# Patient Record
Sex: FEMALE | Race: WHITE | NOT HISPANIC OR LATINO | Employment: OTHER | URBAN - METROPOLITAN AREA
[De-identification: names, ages, dates, MRNs, and addresses within clinical notes are randomized per-mention and may not be internally consistent; named-entity substitution may affect disease eponyms.]

---

## 2017-01-06 RX ORDER — ESTRADIOL 0.04 MG/D
FILM, EXTENDED RELEASE TRANSDERMAL
Qty: 24 PATCH | Refills: 0 | Status: SHIPPED | OUTPATIENT
Start: 2017-01-06 | End: 2017-01-27

## 2017-01-11 RX ORDER — ESTRADIOL 0.07 MG/D
1 FILM, EXTENDED RELEASE TRANSDERMAL 2 TIMES WEEKLY
Qty: 8 PATCH | Refills: 0 | Status: SHIPPED | OUTPATIENT
Start: 2017-01-12 | End: 2017-01-27

## 2017-01-11 NOTE — TELEPHONE ENCOUNTER
Patient requesting a one month refill on Minivelle .075mg. She has an appointment with Dr. Marie on 1/27/2017. E-Rx sent to [facility] [place]

## 2017-01-12 ENCOUNTER — TELEPHONE (OUTPATIENT)
Dept: OBSTETRICS AND GYNECOLOGY | Facility: CLINIC | Age: 67
End: 2017-01-12

## 2017-01-12 NOTE — TELEPHONE ENCOUNTER
----- Message from Carmen Mcgregor sent at 1/12/2017 10:05 AM EST -----  Regarding: ALBINA PRESCRITION CALLED IN TO PHARMACY   Contact: [phone]    0.075 minivelle  Called in for 1 month  To [facility] [place]    prescription to [facility] has been cancelled due insurance to excpensive. [facility] [place].    @10:41am advised patient I sent E-Rx yesterday to [facility] [place]

## 2017-01-27 ENCOUNTER — OFFICE VISIT (OUTPATIENT)
Dept: OBSTETRICS AND GYNECOLOGY | Facility: CLINIC | Age: 67
End: 2017-01-27

## 2017-01-27 VITALS
WEIGHT: 219 LBS | SYSTOLIC BLOOD PRESSURE: 124 MMHG | HEIGHT: 67 IN | DIASTOLIC BLOOD PRESSURE: 80 MMHG | BODY MASS INDEX: 34.37 KG/M2

## 2017-01-27 DIAGNOSIS — Z00.00 ANNUAL PHYSICAL EXAM: Primary | ICD-10-CM

## 2017-01-27 DIAGNOSIS — N95.1 MENOPAUSAL SYMPTOMS: ICD-10-CM

## 2017-01-27 PROBLEM — Z90.710 S/P HYSTERECTOMY: Status: ACTIVE | Noted: 2017-01-27

## 2017-01-27 PROBLEM — K58.2 IRRITABLE BOWEL SYNDROME WITH BOTH CONSTIPATION AND DIARRHEA: Status: ACTIVE | Noted: 2017-01-27

## 2017-01-27 PROCEDURE — 99396 PREV VISIT EST AGE 40-64: CPT | Performed by: OBSTETRICS & GYNECOLOGY

## 2017-01-27 RX ORDER — ESTRADIOL 0.07 MG/D
1 FILM, EXTENDED RELEASE TRANSDERMAL 2 TIMES WEEKLY
COMMUNITY
End: 2017-01-27 | Stop reason: ALTCHOICE

## 2017-01-27 RX ORDER — ESTRADIOL 0.05 MG/D
1 FILM, EXTENDED RELEASE TRANSDERMAL WEEKLY
Qty: 8 PATCH | Refills: 12 | Status: SHIPPED | OUTPATIENT
Start: 2017-01-27 | End: 2018-01-24 | Stop reason: SDUPTHER

## 2017-01-27 RX ORDER — CHOLECALCIFEROL (VITAMIN D3) 125 MCG
2000 CAPSULE ORAL DAILY
COMMUNITY

## 2017-01-27 NOTE — MR AVS SNAPSHOT
Gale Nina   1/27/2017 1:30 PM   Office Visit    Dept Phone:  [phone]   Encounter #:  [id number]    Provider:  Zach Marie MD   Department:  [facility]                Your Full Care Plan              Today's Medication Changes          These changes are accurate as of: 1/27/17  2:24 PM.  If you have any questions, ask your nurse or doctor.               New Medication(s)Ordered:     MINIVELLE 0.05 MG/24HR patch   Generic drug:  estradiol   Place 1 patch on the skin 1 (One) Time Per Week.   Replaces:  MINIVELLE 0.0375 MG/24HR   Started by:  Zach Marie MD         Medication(s)that have changed:     Vitamin D3 2000 UNITS tablet   Take 2,000 Units by mouth Daily.   What changed:  Another medication with the same name was removed. Continue taking this medication, and follow the directions you see here.   Changed by:  Zach Marie MD         Stop taking medication(s)listed here:     estradiol 0.075 MG/24HR patch   Commonly known as:  MINIVELLE   Stopped by:  Zach Marie MD           INVOKANA 100 MG tablet   Generic drug:  Canagliflozin   Stopped by:  Zach Marie MD           MINIVELLE 0.0375 MG/24HR   Generic drug:  estradiol   Replaced by:  MINIVELLE 0.05 MG/24HR patch   Stopped by:  Zach Marie MD           MINIVELLE 0.075 MG/24HR patch   Generic drug:  estradiol   Stopped by:  Zach Marie MD           RED YEAST RICE PO   Stopped by:  Zach Marie MD                Where to Get Your Medications      These medications were [facility] - [place], [place] - [address] AT Portal to Registered [facility] Sites - [phone]  - [phone] FX  [address], [facility] [address]     Phone:  [phone]     MINIVELLE 0.05 MG/24HR patch                  Your Updated Medication List          This list is accurate as of: 1/27/17  2:24 PM.  Always use your most recent med list.                aspirin 325 MG tablet       atorvastatin 10 MG tablet   Commonly known as:  LIPITOR       B-6 PO       JARDIANCE 10 MG tablet   Generic drug:  Empagliflozin       metFORMIN 500 MG tablet   Commonly known as:  GLUCOPHAGE       MINIVELLE 0.05 MG/24HR patch   Generic drug:  estradiol   Place 1 patch on the skin 1 (One) Time Per Week.       omeprazole 40 MG capsule   Commonly known as:  priLOSEC       triamterene-hydrochlorothiazide 37.5-25 MG per tablet   Commonly known as:  MAXZIDE-25       TYLENOL ARTHRITIS PAIN PO       Vitamin D3 2000 UNITS tablet               You Were Diagnosed With        Codes Comments    Annual physical exam    -  Primary ICD-10-CM: Z00.00  ICD-9-CM: V70.0     Menopausal symptoms     ICD-10-CM: N95.1  ICD-9-CM: 627.2       Instructions     None    Patient Instructions History      Upcoming Appointments     Visit Type Date Time Department    ANNUAL 2017  1:30 PM [facility]      [facility] Signup     [facility] allows you to send messages to your doctor, view your test results, renew your prescriptions, schedule appointments, and more. To sign up, go to [employer] and click on the Sign Up Now link in the New User? box. Enter your [facility] Activation Code exactly as it appears below along with the last four digits of your Social Security Number and your Date of Birth () to complete the sign-up process. If you do not sign up before the expiration date, you must request a new code.    [facility] Activation Code: [email or web address]  Expires: 2/10/2017  2:24 PM    If you have questions, you can email [email or web address] or call [phone] to talk to our [facility] staff. Remember, [facility] is NOT to be used for urgent needs. For medical emergencies, dial 911.               Other Info from Your Visit           Your Appointments     2018  1:30 PM EST   Annual with Zach Marie MD   [facility] (--)    [address]  "[address] [place] [id number] [phone]              Allergies     Other      Monsel soln  Nail polish      Reason for Visit     Annual Exam           Vital Signs     Blood Pressure Height Weight Body Mass Index Smoking Status       124/80 67\" (170.2 cm) 219 lb (99.3 kg) 34.3 kg/m2 Never Smoker       Problems and Diagnoses Noted     Irritable bowel syndrome with both constipation and diarrhea    History of hysterectomy    Annual physical exam    -  Primary    Menopausal symptoms            "

## 2017-01-27 NOTE — PROGRESS NOTES
"Subjective   Chief Complaint   Patient presents with   • Annual Exam     Gael Nina is a 66 y.o. year old  menopausal female presenting to be seen for her annual exam.  There has not been vaginal bleeding in the last 12 months.  Hot flashes and night sweats are not a significant problem. They were a significant when she was off of her Minivelle.  She had a bump the dose up from 0.375-0.75 and she's done better.  She had had significant hot flashes and night sweats. I discussed we'll try to bump this down to the 0.5 mg strength.  I gave her some samples and also Estrogel in case she should have significant hot flashes that required treatment until we can bump her back up.  She still working at [employer]. I told her that Dr. Obrien has retired as of today    SEXUAL Hx:  She is not sexually active.  Vaginal dryness is not a problem.    HEALTH Hx:  She exercises regularly: yes.  She wears her seat belt:yes.  She has concerns about domestic violence: no.  She has noticed changes in height: no. Lost one inch?              Calcium intake is adequate    The following portions of the patient's history were reviewed and updated as appropriate:problem list, current medications, allergies, past family history, past medical history, past social history and past surgical history.    Smoking status: Never Smoker                                                                 Smokeless status: Not on file                       Review of Systems normal bladder -dribbles only  if drinks nutrasweet only; diabetes ; IBS      Objective   Visit Vitals   • /80   • Ht 67\" (170.2 cm)   • Wt 219 lb (99.3 kg)   • LMP Comment: LAVH   • BMI 34.3 kg/m2       General:  well developed; well nourished  no acute distress   Skin:  No suspicious lesions seen   Thyroid: not examined   Breasts:  Examined in supine position  Symmetric without masses or skin dimpling  Nipples normal without inversion, lesions or discharge   Abdomen: " soft, non-tender; no masses  no umbilical or inginual hernias are present  no hepato-splenomegaly   Pelvis: Clinical staff was present for exam  External genitalia:  normal appearance of the external genitalia including Bartholin's and Ebro's glands.  :  urethral meatus normal; urethral hypermobility is absent.  Adnexa:  non palpable bilaterally.  Rectal:  anus visually normal appearing. recto-vaginal exam unremarkable and confirms findings; guaiac negative;        Assessment   1. Stable postmenopausal examination status post LAVH  2. Mammogram is up-to-date.     Plan   1. We'll decrease her Minivelle  2. Continue calcium self breast exam exercise as much as possible and to call if she has questions or problems    New Medications Ordered This Visit   Medications   • Empagliflozin (JARDIANCE) 10 MG tablet     Sig: Take 10 mg by mouth Daily.   • Cholecalciferol (VITAMIN D3) 2000 UNITS tablet     Sig: Take 2,000 Units by mouth Daily.   • MINIVELLE 0.05 MG/24HR patch     Sig: Place 1 patch on the skin 1 (One) Time Per Week.     Dispense:  8 patch     Refill:  12          This note was electronically signed.    Zach Marie M.D.  January 27, 2017

## 2018-01-24 ENCOUNTER — TELEPHONE (OUTPATIENT)
Dept: OBSTETRICS AND GYNECOLOGY | Facility: CLINIC | Age: 68
End: 2018-01-24

## 2018-01-24 RX ORDER — ESTRADIOL 0.05 MG/D
1 FILM, EXTENDED RELEASE TRANSDERMAL WEEKLY
Qty: 8 PATCH | Refills: 1 | Status: SHIPPED | OUTPATIENT
Start: 2018-01-24 | End: 2018-03-15

## 2018-01-25 ENCOUNTER — TELEPHONE (OUTPATIENT)
Dept: OBSTETRICS AND GYNECOLOGY | Facility: CLINIC | Age: 68
End: 2018-01-25

## 2018-01-26 ENCOUNTER — DOCUMENTATION (OUTPATIENT)
Dept: OBSTETRICS AND GYNECOLOGY | Facility: CLINIC | Age: 68
End: 2018-01-26

## 2018-01-26 NOTE — TELEPHONE ENCOUNTER
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 She left a message yesterday but didn't get a call back.

## 2018-01-26 NOTE — PROGRESS NOTES
I called her cell phone number and got no answer nor any voicemail opportunity.  I called labor delivery at [facility] and spoke with Dana but Gale is not working tonight.  Left a message that I will try to get all of her on Monday.

## 2018-01-29 ENCOUNTER — DOCUMENTATION (OUTPATIENT)
Dept: OBSTETRICS AND GYNECOLOGY | Facility: CLINIC | Age: 68
End: 2018-01-29

## 2018-01-29 NOTE — TELEPHONE ENCOUNTER
I spoke with her left a note.  I have a couple of sample tubes of Premarin with her name on it and she'll stop by and the next day or 2 to pick these up.

## 2018-01-29 NOTE — PROGRESS NOTES
I finally got a hold of Gale salomon.  Her phone number was incorrectly listed as [phone] instead of [phone].  She had called because her appointment was changed from January 31 until April 4.  She is bottle is out of town.  I explained that I was operating that afternoon.  I was surprised that it would take another 2 months to get her seen for an annual exam.  She had a couple of questions.  One was the cost of Premarin cream was going to be $375 for her part.  She has applied Vaseline in the last 24 hours this has improved.  She had been in more pain than she had her entire life prior to that.  I did discuss that there is a new generic estrogen cream coming out that would be less expensive.  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  I will give her a couple sample tubes of Premarin to use as needed.  Other issue was the cost of Minivelle was cut to be about $600 per year.  She'll be going on Medicare in July of this year.  A physician at [employer] suggested to her trying compounded estrogens.  I told her the dosage may vary quite a bit.  Right now she is using Minivelle 0.05 weekly instead of twice weekly.  She is not having hot flashes at all.  I suggested that since she's been doing this for about 6 weeks that she just stopped the Minivelle and see if she has any hot flashes.  If so we could put her on oral pill or generic patch that will not cost as much if she is symptomatic other issue would be just to treat her vaginally for local symptoms  [facility].  She will stop by next couple days to  the Premarin samples.

## 2018-02-15 ENCOUNTER — OFFICE VISIT (OUTPATIENT)
Dept: OBSTETRICS AND GYNECOLOGY | Facility: CLINIC | Age: 68
End: 2018-02-15

## 2018-02-15 VITALS
RESPIRATION RATE: 16 BRPM | WEIGHT: 213 LBS | SYSTOLIC BLOOD PRESSURE: 124 MMHG | BODY MASS INDEX: 33.36 KG/M2 | DIASTOLIC BLOOD PRESSURE: 70 MMHG

## 2018-02-15 DIAGNOSIS — Z90.710 S/P HYSTERECTOMY: ICD-10-CM

## 2018-02-15 DIAGNOSIS — Z01.419 WOMEN'S ANNUAL ROUTINE GYNECOLOGICAL EXAMINATION: Primary | ICD-10-CM

## 2018-02-15 DIAGNOSIS — N76.0 ACUTE VAGINITIS: ICD-10-CM

## 2018-02-15 DIAGNOSIS — K59.01 SLOW TRANSIT CONSTIPATION: ICD-10-CM

## 2018-02-15 PROBLEM — B37.31 YEAST VAGINITIS: Status: ACTIVE | Noted: 2018-02-15

## 2018-02-15 PROBLEM — L50.9 FULL BODY HIVES: Status: ACTIVE | Noted: 2018-02-15

## 2018-02-15 PROCEDURE — 99397 PER PM REEVAL EST PAT 65+ YR: CPT | Performed by: OBSTETRICS & GYNECOLOGY

## 2018-02-15 RX ORDER — FLUCONAZOLE 150 MG/1
150 TABLET ORAL EVERY OTHER DAY
Qty: 5 TABLET | Refills: 1 | Status: SHIPPED | OUTPATIENT
Start: 2018-02-15 | End: 2018-04-13 | Stop reason: SDUPTHER

## 2018-02-15 RX ORDER — FLUCONAZOLE 150 MG/1
150 TABLET ORAL EVERY OTHER DAY
Qty: 5 TABLET | Refills: 1 | Status: SHIPPED | OUTPATIENT
Start: 2018-02-15 | End: 2018-02-15

## 2018-02-15 NOTE — PROGRESS NOTES
Subjective   Chief Complaint   Patient presents with   • Annual Exam     vag itching since      Gale Nina is a 67 y.o. year old  menopausal female presenting to be seen for her annual exam.  There has not been vaginal bleeding in the last 12 months.  Hot flashes and night sweats are not a significant problem.  She feels this STARTED around  when she had a bout of constipation which is unusual for her.  The itching and irritation started before she changed how she was taking Minivelle.  She had been stretching at her twice weekly Minivelle student once a week due to expense.  She will be on Medicare in July.  Not sure that the Minivelle is covered she may need to go on estradiol orally.  She is status post hysterectomy.    She's been having a lot of vaginal vulvar itching.  No real discharge to speak of.  She's taken Diflucan's a couple of times.  She saw Dr. Lorne Osborne.  She went to [place] in July And went across the street the pharmacy to get some hydrocortisone PK she was really irritated.  This may have irritated her worse.  One of the doctors at [facility] gave her samples of Premarin and that seemed to aggravate the irritation as well.  She is somewhat embarrassed to get that she feels like she needs to almost  have an orgasm constantly she is so irritated.    SEXUAL Hx:  She is not sexually active.  Vaginal dryness is not a problem.    HEALTH Hx:  She exercises regularly: yes.  She wears her seat belt:yes.  She has concerns about domestic violence: no.  She has noticed changes in height: no.              Calcium intake is adequate    The following portions of the patient's history were reviewed and updated as appropriate:problem list, current medications, allergies, past family history, past medical history, past social history and past surgical history.    Smoking status: Never Smoker                                                              Smokeless status: Never  "Used                        Review of Systems   Her bowels and bladder are normal     Objective   /70  Resp 16  Wt 96.6 kg (213 lb)  BMI 33.36 kg/m2     General:  well developed; well nourished  no acute distress  appears stated age   Skin:  No suspicious lesions seen   Thyroid: not examined   Breasts:  Examined in supine position  Symmetric without masses or skin dimpling  Nipples normal without inversion, lesions or discharge  There are no palpable axillary nodes   Abdomen: soft, non-tender; no masses  no umbilical or inginual hernias are present  no hepato-splenomegaly   Pelvis: Clinical staff was present for exam  External genitalia:  There is generalized erythema.  There is some thin white epithelium.  Clitorally almost like agglutination or potentially lichen sclerosus with also some petechiae.  In the vulva there are's fissuring noted bilaterally.  This is erythema extends.  Annually.  :  urethral meatus normal; urethral hypermobility is absent.  Vaginal:  atrophic mucosal changes are present; discharge present -  white, thick and Minimal amount; pH = 4 wet prep done: pseudo-hyphae are present;  Uterus:  absent.  Adnexa:  non palpable bilaterally.  Rectal:  digital rectal exam not performed; anus visually normal appearing.        Assessment   1. Yeast vaginitis  2. Postmenopausal  3. Questionable lichen sclerosus may need to see her back in a few weeks after treatment to see if this requires biopsy or treatment with steroid cream     Plan   1. Calcium discussed  1200 mg daily in divided doses ideally in diet  2. Regular weight bearing exercise  3. Breast self awareness, mammograms discussed  4. Diflucan along with samples of a boric acid probiotic vaginal suppository \"hylafem\" it  in 2017 that may be helpful.  5. I would have her use zinc oxide and/or 5 and a and D ointment past sling would be fine just to coat and protect the skin until we can get that she's to clear up.    No " orders of the defined types were placed in this encounter.          This note was electronically signed.    Zach Marie M.D.  February 15, 2018

## 2018-03-15 ENCOUNTER — OFFICE VISIT (OUTPATIENT)
Dept: OBSTETRICS AND GYNECOLOGY | Facility: CLINIC | Age: 68
End: 2018-03-15

## 2018-03-15 VITALS — RESPIRATION RATE: 16 BRPM | DIASTOLIC BLOOD PRESSURE: 80 MMHG | SYSTOLIC BLOOD PRESSURE: 118 MMHG

## 2018-03-15 DIAGNOSIS — N95.1 MENOPAUSAL SYMPTOMS: ICD-10-CM

## 2018-03-15 DIAGNOSIS — Z09 SURGERY FOLLOW-UP: Primary | ICD-10-CM

## 2018-03-15 PROCEDURE — 99212-NC PR NO CHARGE CBC OFFICE OUTPATIENT VISIT 10 MINUTES: Performed by: OBSTETRICS & GYNECOLOGY

## 2018-03-15 RX ORDER — ESTRADIOL 1 MG/1
1 TABLET ORAL DAILY
Qty: 90 TABLET | Refills: 3 | Status: SHIPPED | OUTPATIENT
Start: 2018-03-15 | End: 2018-03-15

## 2018-03-15 RX ORDER — ESTRADIOL 0.5 MG/1
0.5 TABLET ORAL DAILY
Qty: 90 TABLET | Refills: 3 | Status: SHIPPED | OUTPATIENT
Start: 2018-03-15 | End: 2019-03-04

## 2018-03-15 NOTE — PROGRESS NOTES
Gale is much improved.  The redness is gone no irritation whatsoever.  She did have some blistering due to the boric acid ovules that I gave her over-the-counter samples.  We will avoid those she feels this may been she was so irritated to start with.  Right now she is using Evamist and not having problems with hot flashes.  We discussed options and 40 with her insurance both the Evamist and gels are very expensive Premarin cream about $240 for a tube.  She will be going on Medicare in July.  She has about 30-60 days worth of Evamist left and we'll see if there's another sample for her.    I will send in a prescription for Estrace 0.5 mg tablets numbers 90 with 3 refillsShe is okay with that she may put that on hold pending the above use of Evamist.  I discussed that she may wean off of Evamist and/or Estrace pills but would probably need a Premarin cream or estrogen cream once or twice weekly to prevent yeast infections etc. Estrace should be coming out with the generic form that she may be able to use at less cost.  No charge for today's visit is no exam was done.  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

## 2018-04-13 ENCOUNTER — TELEPHONE (OUTPATIENT)
Dept: OBSTETRICS AND GYNECOLOGY | Facility: CLINIC | Age: 68
End: 2018-04-13

## 2018-04-13 RX ORDER — FLUCONAZOLE 150 MG/1
150 TABLET ORAL EVERY OTHER DAY
Qty: 5 TABLET | Refills: 1 | Status: SHIPPED | OUTPATIENT
Start: 2018-04-13 | End: 2019-03-04

## 2018-04-13 NOTE — TELEPHONE ENCOUNTER
Provider Name  Dr Marie    Reason for Call  Yeast infection has returned, wants diflucan 5 days    Pharmacy Name  [facility] in [place]    Call Back Number  [phone]

## 2018-04-17 ENCOUNTER — TELEPHONE (OUTPATIENT)
Dept: OBSTETRICS AND GYNECOLOGY | Facility: CLINIC | Age: 68
End: 2018-04-17

## 2019-03-04 ENCOUNTER — OFFICE VISIT (OUTPATIENT)
Dept: OBSTETRICS AND GYNECOLOGY | Facility: CLINIC | Age: 69
End: 2019-03-04

## 2019-03-04 VITALS
DIASTOLIC BLOOD PRESSURE: 80 MMHG | HEIGHT: 68 IN | SYSTOLIC BLOOD PRESSURE: 128 MMHG | BODY MASS INDEX: 32.74 KG/M2 | WEIGHT: 216 LBS

## 2019-03-04 DIAGNOSIS — Z01.419 ENCOUNTER FOR GYNECOLOGICAL EXAMINATION WITHOUT ABNORMAL FINDING: Primary | ICD-10-CM

## 2019-03-04 PROBLEM — Z90.710 S/P HYSTERECTOMY: Status: RESOLVED | Noted: 2017-01-27 | Resolved: 2019-03-04

## 2019-03-04 PROCEDURE — G0101 CA SCREEN;PELVIC/BREAST EXAM: HCPCS | Performed by: OBSTETRICS & GYNECOLOGY

## 2019-03-04 RX ORDER — LOSARTAN POTASSIUM 50 MG/1
50 TABLET ORAL DAILY
COMMUNITY

## 2019-03-04 NOTE — PROGRESS NOTES
DataSubjective   Chief Complaint   Patient presents with   • Annual Exam     Gale Nina is a 68 y.o. year old  who is post-menopausal.  She is S/P hysterectomy presenting to be seen for her annual exam.  This past year she has been on hormone replacement therapy. She has used samples of Estrogel?evamist.  Got some from Dr. Ravi Sánchez.  Nothing since . Only 1 hot flash per month. There has not been vaginal bleeding in the last 12 months.  Menopausal symptoms are not present.  She will use some of the cream once a week and tablets gone and then if she has any symptoms can use some pills she has.  Discussed vacation in [place] and [place] recently.    She has retired as of 2019.  She has not really figured out what to do in the winter months.  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    SEXUAL Hx:  She is not currently sexually active.    [place] is painful: not asked              She has concerns about domestic violence no    HEALTH Hx:  Level of weekly physical activity: 1.5 hours weather  Problem now  She wears her seat belt: yes.  Self breast awareness: yes  Calcium servings per day: 4  Caffeine intake:3 equivalent to a cup of coffee              Alcohol:regular (moderate)              Social History    Tobacco Use      Smoking status: Never Smoker      Smokeless tobacco: Never Used      The following portions of the patient's history were reviewed and updated as appropriate:problem list, current medications, allergies, past family history, past medical history, past social history and past surgical history    Current Outpatient Medications:   •  Acetaminophen (TYLENOL ARTHRITIS PAIN PO), Take  by mouth., Disp: , Rfl:   •  aspirin 325 MG tablet, Take 325 mg by mouth 2 (Two) Times a Week., Disp: , Rfl:   •  atorvastatin (LIPITOR) 10 MG tablet, Take 10 mg by mouth Daily., Disp: , Rfl:   •  Canagliflozin (INVOKANA) 100 MG tablet, Take  by mouth., Disp: , Rfl:   •   "Cholecalciferol (VITAMIN D3) 2000 UNITS tablet, Take 2,000 Units by mouth Daily., Disp: , Rfl:   •  losartan (COZAAR) 50 MG tablet, Take 50 mg by mouth Daily., Disp: , Rfl:   •  metFORMIN (GLUCOPHAGE) 500 MG tablet, Take 1,000 mg by mouth 2 (Two) Times a Day With Meals., Disp: , Rfl:   •  omeprazole (priLOSEC) 40 MG capsule, Take 40 mg by mouth Daily., Disp: , Rfl:   •  Pyridoxine HCl (B-6 PO), Take  by mouth., Disp: , Rfl:     Review of Systems  Constitutional POS: nothing reported    NEG: anorexia or night sweats   Genitourinary POS: nothing reported urgency due to sugar substitutes and rare leakage if waits too long ; Invankana causes urgency    NEG: dysuria or hematuria   Gastointestinal POS: nothing reported    NEG: bloating, change in bowel habits, melena or reflux symptoms   Integument POS: nothing reported    NEG: moles that are changing in size, shape, color or rashes   Breast POS: nothing reported    NEG: persistent breast lump, skin dimpling or nipple discharge        Objective   /80   Ht 172.7 cm (68\")   Wt 98 kg (216 lb)   Breastfeeding? No   BMI 32.84 kg/m²     General:  well developed; well nourished  no acute distress  appears stated age   Skin:  No suspicious lesions seen   Thyroid: not examined   Breasts:  Examined in supine position  Symmetric without masses or skin dimpling  Nipples normal without inversion, lesions or discharge  There are no palpable axillary nodes   Abdomen: soft, non-tender; no masses  no umbilical or inguinal hernias are present  no hepato-splenomegaly   Pelvis: Clinical staff was present for exam  External genitalia:  normal appearance of the external genitalia including Bartholin's and Guthrie's glands.  :  urethral meatus normal;  Vaginal:  atrophic mucosal changes are present; she is able to perform a Kegel contraction upon request;  Uterus:  absent.  Adnexa:  non palpable bilaterally.       Lab and Imaging Review   No data reviewed  Mammogram report     "   Assessment   1. Normal post hysterectomy postmenopausal examination.  2. Gynecologic, breast and colorectal screening protocols were reviewed.       Plan   1. She would use the vaginal estrogen cream once a week and then maybe go back on the pill samples that she has.  She will call if she needs any other samples.  2. Discussed regular voiding and Keagle's that she is already doing to help avoid any significant leakage.  3. The importance of keeping all planned follow-up and taking all medications as prescribed was emphasized.  4. Self breast awareness and mammogram protocols discussed.  5. Regular exercise and calcium ( ideally dietary) discussed  6. Follow up for annual exam or PRN     No orders of the defined types were placed in this encounter.    No orders of the defined types were placed in this encounter.         This note was electronically signed.    Zach Marie MD  March 4, 2019    Note: Speech recognition transcription software may have been used to create portions of this document.  An attempt at proofreading has been made but errors in transcription could still be present.

## 2020-03-06 ENCOUNTER — OFFICE VISIT (OUTPATIENT)
Dept: OBSTETRICS AND GYNECOLOGY | Facility: CLINIC | Age: 70
End: 2020-03-06

## 2020-03-06 VITALS
DIASTOLIC BLOOD PRESSURE: 80 MMHG | SYSTOLIC BLOOD PRESSURE: 126 MMHG | HEIGHT: 68 IN | WEIGHT: 217 LBS | BODY MASS INDEX: 32.89 KG/M2

## 2020-03-06 DIAGNOSIS — K58.2 IRRITABLE BOWEL SYNDROME WITH BOTH CONSTIPATION AND DIARRHEA: ICD-10-CM

## 2020-03-06 DIAGNOSIS — N95.2 VAGINAL ATROPHY: ICD-10-CM

## 2020-03-06 DIAGNOSIS — N95.1 MENOPAUSAL SYMPTOMS: Primary | ICD-10-CM

## 2020-03-06 PROCEDURE — 99213 OFFICE O/P EST LOW 20 MIN: CPT | Performed by: OBSTETRICS & GYNECOLOGY

## 2020-03-06 RX ORDER — BLOOD SUGAR DIAGNOSTIC
STRIP MISCELLANEOUS
COMMUNITY
Start: 2020-02-14

## 2020-03-06 RX ORDER — BLOOD-GLUCOSE METER
EACH MISCELLANEOUS
COMMUNITY
Start: 2020-02-14

## 2020-03-06 RX ORDER — PIOGLITAZONEHYDROCHLORIDE 15 MG/1
TABLET ORAL
COMMUNITY
Start: 2020-02-16

## 2020-03-06 NOTE — PROGRESS NOTES
Subjective   Chief Complaint   Patient presents with   • Menopause     Gale Nina is a 69 y.o. year old .  She is status post laparoscopic-assisted vaginal hysterectomy and bilateral salpingo-oophorectomy.  She presents to be seen because of MENOPAUSAL SYMPTOMS  She has been doing fairly well using Premarin cream just once a week after she stopped taking oral estrogen.  She is about 15 years status post hysterectomy.  Last Pap smear  was normal does not need anymore Pap testing  Last mammogram May 2019 normal  She had a DEXA scan to Dr. Osborne's office about 2 years ago which was normal probably does not need to be repeated  Colonoscopy  and a Cologuard last year.  Has issues with IBS.  May benefit from repeat colonoscopy     She fell on black ice last Thursday and injured her left shoulder.  She cannot use it very well cannot lift above her neck height and or movement to grab coffee cups for instance at [employer].  I would suggest physical therapy evaluation.  She has a appointment with Dr. Lorne Osborne in about 2 weeks.                    The following portions of the patient's history were reviewed and updated as appropriate:problem list, current medications and allergies      Current Outpatient Medications:   •  ACCU-CHEK ANANYA PLUS test strip, , Disp: , Rfl:   •  Acetaminophen (TYLENOL ARTHRITIS PAIN PO), Take  by mouth., Disp: , Rfl:   •  aspirin 325 MG tablet, Take 325 mg by mouth 2 (Two) Times a Week., Disp: , Rfl:   •  atorvastatin (LIPITOR) 10 MG tablet, Take 10 mg by mouth Daily., Disp: , Rfl:   •  Blood Glucose Monitoring Suppl (ACCU-CHEK ANANYA PLUS) w/Device kit, , Disp: , Rfl:   •  Cholecalciferol (VITAMIN D3) 2000 UNITS tablet, Take 2,000 Units by mouth Daily., Disp: , Rfl:   •  conjugated estrogens (PREMARIN) 0.625 MG/GM vaginal cream, Insert  into the vagina Every 30 (Thirty) Days., Disp: , Rfl:   •  losartan (COZAAR) 50 MG tablet, Take 50 mg by mouth Daily., Disp: , Rfl:   •   "metFORMIN (GLUCOPHAGE) 500 MG tablet, Take 1,000 mg by mouth 2 (Two) Times a Day With Meals., Disp: , Rfl:   •  pioglitazone (ACTOS) 15 MG tablet, , Disp: , Rfl:   •  Pyridoxine HCl (B-6 PO), Take  by mouth., Disp: , Rfl:     no or minimal alcohol, nonsmoker, no or mild caffeine use    Review of Systems   normal bladder and IBS diarrhea worse with Metformin  Objective   /80   Ht 172.1 cm (67.75\")   Wt 98.4 kg (217 lb)   Breastfeeding No   BMI 33.24 kg/m²     General:  well developed; well nourished  no acute distress  appears stated age   Skin:  No suspicious lesions seen   Thyroid: not examined   Breast: Dense breast tissue bilaterally no suspicious mass or asymmetry that it does appear to be an accessory nipple underneath the left nipple very close to the areola   Lungs:  breathing is unlabored   Heart:  Not performed.   Abdomen: soft, non-tender; no masses  no umbilical or inguinal hernias are present  no hepato-splenomegaly   Pelvis: Clinical staff was present for exam  External genitalia:  normal appearance of the external genitalia including Bartholin's and [place]'s glands.  :  urethral meatus normal;  Vaginal:  atrophic mucosal changes are present;  Uterus:  absent.  Adnexa:  non palpable bilaterally.  Rectal:  digital rectal exam not performed; anus visually normal appearing.     Lab Review   Pap test    Imaging   Mammogram report       Assessment   1. Normal post menopausal post hysterectomy examination with exception of  2. Vaginal atrophy and some occasional hot flashes that seem to be treated well with about monthly Premarin cream so we will give her samples #3  3. I think she is hurt her left rotator cuff and/or shoulder will give her written note for physical therapy as she usually goes to see [facility] orthopedics on [address] or off [facility]  4. Mammogram colonoscopy DEXA all up-to-date  5. IBS discussed potential [place] to bulk up her stool if needed       Plan   1.  Premarin cream " once monthly or so  Self breast examination regular mammograms  Note or order for physical therapy evaluation of her left shoulder  Annual or biannual examination        No orders of the defined types were placed in this encounter.    No orders of the defined types were placed in this encounter.             This note was electronically signed.    Zach Marie MD  March 6, 2020

## 2021-04-02 ENCOUNTER — OFFICE VISIT (OUTPATIENT)
Dept: OBSTETRICS AND GYNECOLOGY | Facility: CLINIC | Age: 71
End: 2021-04-02

## 2021-04-02 VITALS
SYSTOLIC BLOOD PRESSURE: 122 MMHG | DIASTOLIC BLOOD PRESSURE: 70 MMHG | HEIGHT: 68 IN | BODY MASS INDEX: 33.34 KG/M2 | WEIGHT: 220 LBS

## 2021-04-02 DIAGNOSIS — Z01.419 ENCOUNTER FOR GYNECOLOGICAL EXAMINATION WITHOUT ABNORMAL FINDING: Primary | ICD-10-CM

## 2021-04-02 PROBLEM — L50.9 FULL BODY HIVES: Status: RESOLVED | Noted: 2018-02-15 | Resolved: 2021-04-02

## 2021-04-02 PROCEDURE — G0101 CA SCREEN;PELVIC/BREAST EXAM: HCPCS | Performed by: OBSTETRICS & GYNECOLOGY

## 2021-04-02 RX ORDER — NICOTINE POLACRILEX 2 MG
GUM BUCCAL
COMMUNITY

## 2021-04-02 RX ORDER — FERROUS SULFATE TAB EC 324 MG (65 MG FE EQUIVALENT) 324 (65 FE) MG
324 TABLET DELAYED RESPONSE ORAL
COMMUNITY

## 2021-04-02 RX ORDER — METFORMIN HYDROCHLORIDE 500 MG/1
TABLET, EXTENDED RELEASE ORAL
COMMUNITY
Start: 2021-01-05

## 2021-04-02 NOTE — PROGRESS NOTES
DataSubjective   Chief Complaint   Patient presents with   • Annual Exam     Gale Nina is a 70 y.o. year old  who is post-menopausal.  She is S/P hysterectomy presenting to be seen for her annual exam.  This past year she has not been on hormone replacement therapy.  There has not been vaginal bleeding in the last 12 months.  Menopausal symptoms are not present.  Discussed my skilled nursing.  She was wondering when I might do that.  She recently had lunch with some [facility] labor delivery nurses Vee and too and they were wondering if I was still working.  She went through a depression after the woman she lives with her about 25 years  after being in hospice care.  She has back to normal now    SEXUAL Hx:  She is not currently sexually active.    [place] is painful: not asked              She has concerns about domestic violence no    HEALTH Hx:  Level of weekly physical activity: 2 hours  She wears her seat belt: yes.  Self breast awareness: no  Calcium servings per day: 4  Caffeine intake:1 equivalent to a cup of coffee  Social History     Substance and Sexual Activity   Alcohol Use Yes    Comment: WINE Q 6 MONTHS                 Social History    Tobacco Use      Smoking status: Never Smoker      Smokeless tobacco: Never Used      The following portions of the patient's history were reviewed and updated as appropriate:problem list, current medications, allergies, past family history, past medical history, past social history and past surgical history    Current Outpatient Medications:   •  ACCU-CHEK ANANYA PLUS test strip, , Disp: , Rfl:   •  Acetaminophen (TYLENOL ARTHRITIS PAIN PO), Take  by mouth., Disp: , Rfl:   •  aspirin 325 MG tablet, Take 325 mg by mouth 2 (Two) Times a Week., Disp: , Rfl:   •  atorvastatin (LIPITOR) 10 MG tablet, Take 10 mg by mouth Daily., Disp: , Rfl:   •  Biotin 1 MG capsule, Take  by mouth., Disp: , Rfl:   •  Blood Glucose Monitoring Suppl (ACCU-CHEK ANANYA PLUS)  "w/Device kit, , Disp: , Rfl:   •  Cholecalciferol (VITAMIN D3) 2000 UNITS tablet, Take 2,000 Units by mouth Daily., Disp: , Rfl:   •  conjugated estrogens (PREMARIN) 0.625 MG/GM vaginal cream, Insert  into the vagina Every 30 (Thirty) Days., Disp: , Rfl:   •  ferrous sulfate 324 (65 Fe) MG tablet delayed-release EC tablet, Take 324 mg by mouth Daily With Breakfast. Hbg is back to normal, Disp: , Rfl:   •  losartan (COZAAR) 50 MG tablet, Take 50 mg by mouth Daily., Disp: , Rfl:   •  metFORMIN ER (GLUCOPHAGE-XR) 500 MG 24 hr tablet, , Disp: , Rfl:   •  pioglitazone (ACTOS) 15 MG tablet, , Disp: , Rfl:   •  Pyridoxine HCl (B-6 PO), Take  by mouth., Disp: , Rfl:     Review of Systems  Constitutional POS: nothing reported Low Hgb at [facility]     NEG: anorexia, fatigue, fevers or night sweats   Genitourinary POS: urge incontinene is present but it IS NOT effecting her ADL's    NEG: dysuria, frequency or hematuria   Gastointestinal POS: constipation (new onset)    NEG: bloating, change in bowel habits, melena or reflux symptoms   Breast                       POS: nothing reported  NEG: persistent breast lump, skin dimpling, breast tenderness or nipple discharge                    Objective   /70   Ht 172.1 cm (67.75\")   Wt 99.8 kg (220 lb)   Breastfeeding No   BMI 33.70 kg/m²     General:  well developed; well nourished  no acute distress  appears stated age   Skin:  No suspicious lesions seen   Thyroid: not examined   Breasts:  Examined in supine position  Symmetric without masses or skin dimpling  Nipples normal without inversion, lesions or discharge  Fibrocystic changes are present both breasts without a discrete mass   Abdomen: soft, non-tender; no masses  no umbilical or inguinal hernias are present  no hepato-splenomegaly   Pelvis: Clinical staff was present for exam  External genitalia:  normal appearance of the external genitalia including Bartholin's and [place]'s glands.  :  urethral meatus " normal;  Vaginal:  atrophic mucosal changes are present;  Uterus:  absent.  Adnexa:  non palpable bilaterally.  Rectal:  digital rectal exam not performed; anus visually normal appearing.       Lab and Imaging Review   Pap test  Mammogram report               Assessment     1. Normal postmenopausal post hysterectomy examination.  Doing well she uses estrogen cream about twice a month to prevent dryness.  I gave her some samples  2. Gynecologic, breast and colorectal screening protocols were reviewed.  She had a DEXA scan that was normal.    3. Some recent constipation.    4. Recently hemoglobin was low but bumped back up to 13 5 after iron therapy.  She gets this checked at [facility] she will be seeing primary care in June and will get blood work done then discussed possible need for colonoscopy if it remains low  5. nursing home discussed she may follow-up with her primary care provider she is status post complete hysterectomy.  Provide a day refill of estrogen cream.  Discussed that if she had any issues she should or could be referred to gynecologist at that time.       Plan     1. The importance of keeping all planned follow-up and taking all medications as prescribed was emphasized.  2. Self breast awareness and mammogram was normal June 2020 she get another 1 this June  3. Regular exercise and calcium ( ideally dietary) discussed  4. Follow up for annual exam or PRN   5.     No orders of the defined types were placed in this encounter.    No orders of the defined types were placed in this encounter.           This note was electronically signed.    Zach Marie MD  April 2, 2021    Note: Speech recognition transcription software may have been used to create portions of this document.  An attempt at proofreading has been made but errors in transcription could still be present.

## 2023-06-29 ENCOUNTER — TELEPHONE (OUTPATIENT)
Dept: SLEEP MEDICINE | Facility: HOSPITAL | Age: 73
End: 2023-06-29

## 2023-06-29 NOTE — TELEPHONE ENCOUNTER
"  Caller: Gale Nina    Relationship: Self    Best call back number: [phone]    What is the best time to reach you: ANYTIME     Who are you requesting to speak with (clinical staff, provider,  specific staff member): PROVIDER     Do you know the name of the person who called: ROSEANNA GONZALES     What was the call regarding: PT NEEDS TO SCHEDULE A SLEEP STUDY    PT CALLED TO RETURN A CALL THAT SHE RECEIVED RE GUARDING SCHEDULING HER SLEEP STUDY. \"HUB READY TO SCHEDULE\" STATUS IN REFERRAL BUT THE DAYS AND TIME DID NOT WORK FOR HER. SHE STATED ANYTIME TO CALL IS A GOOD TIME, AND TO LEAVE A  IF UNABLE TO REACH HER. SHE HAD TO GET BACK TO WORK AND WAS UNABLE TO STAY FOR ME TO TRANSFER HER.           "

## 2023-09-07 NOTE — PROGRESS NOTES
Chief Complaint  Sleeping Problem    Subjective     History of Present Illness:  Gale Nina is a 73 y.o. female with a history of hypertension, hyperlipidemia, diabetes mellitus, obesity, iron deficiency anemia, and headaches.  The patient is referred by Nichol Salamanca MD with primary care.  Review of patient's self-reported questionnaire notes symptoms including snoring, witnessed apnea, dry mouth, history of broken nose, nasal allergies, leg and body jerks, and frequent nighttime urination.  The patient reports the symptoms have been ongoing for less than a year.  She typically goes to bed at 11:30 PM waking at 730-8 AM on weekdays and weekends.  Patient estimates an average of 7+ hours of sleep per night and it takes her a variable amount of time to get to sleep.  She denies use of tobacco, drinks alcohol monthly or less, and denies use of illicit or recreational drugs.  She rarely drinks coffee, occasionally drinks tea, and drinks a diet cola daily.     Further details are as follows:    [place] Scale is (out of 24): Total score: 0     Estimated average amount of sleep per night: 7 hours  Number of times she wakes up at night: perhaps 1 time to go to the bathroom.  Difficulty falling back asleep: occasionally  It usually takes 5-10 minutes to go to sleep.  She feels sleepy upon waking up: yes  Rotating or night shift work: no    Drowsiness/Sleepiness:  She exhibits the following:  excessive daytime sleepiness  excessive daytime fatigue    Snoring/Breathing:  She exhibits the following:  loud snoring, quits breathing at night, awakens with dry mouth, and coughing    Head Injury:  She exhibits the following:  No    Reflux:  She describes the following:  wakes up at night with a sour taste or burning sensation in chest    Narcolepsy:  She exhibits the following:  none    RLS/PLMs:  She describes the following:  moves or jerks during sleep    Insomnia:  She describes the following:  Leg pain with  neuropathy    Parasomnia:  She exhibits the following:  none    Weight:       23   Weight: 101 kg (223 lb 9.6 oz)      Weight change in the last year:  gain: 0 lbs    The patient's relevant past medical, surgical, family, and social history reviewed and updated in Epic as appropriate.    Review of Systems   Constitutional: Negative.    HENT: Negative.     Eyes: Negative.    Respiratory:  Positive for cough and wheezing.    Cardiovascular: Negative.    Gastrointestinal: Negative.    Endocrine: Negative.    Genitourinary: Negative.    Musculoskeletal: Negative.    Skin: Negative.    Allergic/Immunologic: Positive for environmental allergies.   Neurological: Negative.    Hematological: Negative.    Psychiatric/Behavioral: Negative.     All other systems reviewed and are negative.    PMH:    Past Medical History:   Diagnosis Date    Arrhythmia     Diabetes mellitus     Frequent headaches     Full body hives 2016 02/15/2018    Hyperlipidemia     Hypertension     IBS (irritable bowel syndrome)     RLS (restless legs syndrome)      Past Surgical History:   Procedure Laterality Date    CHOLECYSTECTOMY  1972    COLONOSCOPY      NORMAL COLOGUARD     DILATATION AND CURETTAGE      EXPLORATORY LAPAROTOMY  1997     BOWEL ADHESIONS    KNEE ARTHROSCOPY W/ MENISCECTOMY Right 2010    LAPAROSCOPIC ASSISTED VAGINAL HYSTERECTOMY SALPINGO OOPHORECTOMY Bilateral     LYSIS OF ABDOMINAL ADHESIONS      RECTAL FISSURE INCISION AND DRAINAGE      TONSILLECTOMY       OB History          0    Para   0    Term   0       0    AB   0    Living   0         SAB   0    IAB   0    Ectopic   0    Molar        Multiple   0    Live Births                  Allergies   Allergen Reactions    Other Hives     Monsel soln  Nail polish  euxyl k 400  2 hydoxy-4-methoxy    Benzophenone Hives    Benzoyl Peroxide Hives    Hammond Chloride [[place]] Hives    Copper Sulfate Hives    Lidocaine Hcl Hives and Rash    Nickel Hives     "Potassium Dichromate Hives    Propolis Hives    Propylene Glycol Hives       MEDS:  Prior to Admission medications    Medication Sig Start Date End Date Taking? Authorizing Provider   ACCU-CHEK ANANYA PLUS test strip  2/14/20   Bushra Gorman MD   Acetaminophen (TYLENOL ARTHRITIS PAIN PO) Take  by mouth.    Bushra Gorman MD   aspirin 325 MG tablet Take 325 mg by mouth 2 (Two) Times a Week.    Bushra Gorman MD   atorvastatin (LIPITOR) 10 MG tablet Take 10 mg by mouth Daily.    Bushra Gorman MD   Biotin 1 MG capsule Take  by mouth.    Bushra Gorman MD   Blood Glucose Monitoring Suppl (ACCU-CHEK ANANYA PLUS) w/Device kit  2/14/20   Bushra Gorman MD   calcium carbonate-cholecalciferol 500-400 MG-UNIT tablet tablet Take 1 tablet by mouth Daily.    Bushra Gorman MD   Cholecalciferol (VITAMIN D3) 2000 UNITS tablet Take 2,000 Units by mouth Daily.    Bushra Gorman MD   conjugated estrogens (PREMARIN) 0.625 MG/GM vaginal cream Insert  into the vagina Every 30 (Thirty) Days.    Bushra Gorman MD   ferrous sulfate 324 (65 Fe) MG tablet delayed-release EC tablet Take 324 mg by mouth Daily With Breakfast. Hbg is back to normal    Bushra Gorman MD   losartan (COZAAR) 50 MG tablet Take 50 mg by mouth Daily.    Bushra Gorman MD   metFORMIN ER (GLUCOPHAGE-XR) 500 MG 24 hr tablet  1/5/21   Bushra Gorman MD   pioglitazone (ACTOS) 15 MG tablet  2/16/20   Bushra Gorman MD   Pyridoxine HCl (B-6 PO) Take  by mouth.    Bushra Gorman MD       FH:  Family History   Problem Relation Age of Onset    Breast cancer Paternal Aunt     Diabetes Mother     Bone cancer Mother     Prostate cancer Father     Lung cancer Brother     Stroke Sister 84    Neuropathy Sister     Colon cancer Niece 52       Objective   Vital Signs:  /67 (BP Location: Left arm, Patient Position: Sitting, Cuff Size: Adult)   Pulse 82   Ht 172.7 cm (68\")   Wt 101 " kg (223 lb 9.6 oz)   SpO2 95%   BMI 34.00 kg/m²     BMI cannot be calculated due to outdated height or weight values.  Please input a current height/weight in Vitals and re-renter BMIFOLLOWUP in Note to pull in correct documentation based on BMI range.        Physical Exam  Vitals reviewed.   Constitutional:       Appearance: Normal appearance.   HENT:      Head: Normocephalic and atraumatic.      Nose: Nose normal.      Mouth/Throat:      Mouth: Mucous membranes are moist.   Cardiovascular:      Rate and Rhythm: Normal rate and regular rhythm.      Heart sounds: No murmur heard.    No friction rub. No gallop.   Pulmonary:      Effort: Pulmonary effort is normal. No respiratory distress.      Breath sounds: Normal breath sounds. No wheezing or rhonchi.   Neurological:      Mental Status: She is alert and oriented to person, place, and time.   Psychiatric:         Behavior: Behavior normal.       Mallampati Score: III (soft and hard palate and base of uvula visible)    Result Review :              Assessment and Plan  Gale Nina is a 73 y.o. female with a past medical history of hypertension, hyperlipidemia, diabetes mellitus, obesity, iron deficiency anemia, and headaches who presents for further evaluation of excessive daytime sleepiness and fatigue, nonrestorative sleep, and concerns for sleep disordered breathing and obstructive sleep apnea. The patient's symptoms, particularly snoring and pauses, are concerning for significant sleep disordered breathing and obstructive sleep apnea. We will obtain a home sleep test for further evaluation.  The patient will return for follow-up and recommendations after test.  I have discussed weight loss as it pertains to obstructive sleep apnea.     Diagnoses and all orders for this visit:    1. Snoring (Primary)  -     Home Sleep Study; Future    2. Suspected sleep apnea  -     Home Sleep Study; Future    3. Excessive daytime sleepiness  -     Home Sleep Study;  Future                 I discussed the consequences of uncontrolled sleep apnea including hypertension, heart disease, diabetes, stroke, and dementia. I further discussed sleep apnea therapeutic options including CPAP, Weight loss, Oral dental appliance, and surgery.         Follow Up  Return for Follow up after study.  Patient was given instructions and counseling regarding her condition or for health maintenance advice. Please see specific information pulled into the AVS if appropriate.     MAXIMILIANO Montez, [facility] CenterP-BC  Pulmonology, Critical Care, and Sleep Medicine

## 2023-09-11 ENCOUNTER — OFFICE VISIT (OUTPATIENT)
Dept: SLEEP MEDICINE | Facility: HOSPITAL | Age: 73
End: 2023-09-11
Payer: MEDICARE

## 2023-09-11 VITALS
WEIGHT: 223.6 LBS | SYSTOLIC BLOOD PRESSURE: 134 MMHG | BODY MASS INDEX: 33.89 KG/M2 | HEART RATE: 82 BPM | OXYGEN SATURATION: 95 % | DIASTOLIC BLOOD PRESSURE: 67 MMHG | HEIGHT: 68 IN

## 2023-09-11 DIAGNOSIS — G47.19 EXCESSIVE DAYTIME SLEEPINESS: ICD-10-CM

## 2023-09-11 DIAGNOSIS — R06.83 SNORING: Primary | ICD-10-CM

## 2023-09-11 DIAGNOSIS — R29.818 SUSPECTED SLEEP APNEA: ICD-10-CM

## 2023-09-11 RX ORDER — CETIRIZINE HYDROCHLORIDE 5 MG/1
5 TABLET ORAL DAILY
COMMUNITY

## 2023-11-13 ENCOUNTER — HOSPITAL ENCOUNTER (OUTPATIENT)
Dept: SLEEP MEDICINE | Facility: HOSPITAL | Age: 73
Discharge: HOME | End: 2023-11-13
Admitting: NURSE PRACTITIONER
Payer: MEDICARE

## 2023-11-13 VITALS — HEIGHT: 68 IN | WEIGHT: 223.55 LBS | BODY MASS INDEX: 33.88 KG/M2

## 2023-11-13 DIAGNOSIS — R06.83 SNORING: ICD-10-CM

## 2023-11-13 DIAGNOSIS — R29.818 SUSPECTED SLEEP APNEA: ICD-10-CM

## 2023-11-13 DIAGNOSIS — G47.19 EXCESSIVE DAYTIME SLEEPINESS: ICD-10-CM

## 2023-11-13 PROCEDURE — 95806 SLEEP STUDY UNATT&RESP EFFT: CPT

## 2023-11-16 DIAGNOSIS — G47.33 OSA (OBSTRUCTIVE SLEEP APNEA): Primary | ICD-10-CM

## 2023-12-01 ENCOUNTER — TELEPHONE (OUTPATIENT)
Dept: SLEEP MEDICINE | Facility: HOSPITAL | Age: 73
End: 2023-12-01
Payer: MEDICARE

## 2025-02-06 ENCOUNTER — TELEPHONE (OUTPATIENT)
Dept: PULMONOLOGY | Facility: CLINIC | Age: 75
End: 2025-02-06
Payer: MEDICARE

## 2025-02-06 NOTE — TELEPHONE ENCOUNTER
Caller: Gale Nina    Relationship: Self    Best call back number:   Telephone Information:   Mobile [phone]         What is the best time to reach you: ANYTIME    Who are you requesting to speak with (clinical staff, provider,  specific staff member): CLINICAL STAFF / PROVIDER    What was the call regarding: PT CALLED WONDERING IF SHE NEEDED AND FOLLOW UP APPT, STATED SHE IS DOING FINE BUT IS GETTING HER C-PAP SIGNED AND WAS WORRIED ABOUT IT GETTING DENIED IF NO APPT. PLEASE ADVISE.

## 2025-03-03 NOTE — PROGRESS NOTES
Sleep Clinic Follow Up Note    Chief Complaint  Sleeping Problem, Follow-up, and Sleep Apnea    Subjective     History of Present Illness (from previous encounter on 9/11/2023):  Gale Nina is a 73 y.o. female with a history of hypertension, hyperlipidemia, diabetes mellitus, obesity, iron deficiency anemia, and headaches.  The patient is referred by Nichol Salamanca MD with primary care.  Review of patient's self-reported questionnaire notes symptoms including snoring, witnessed apnea, dry mouth, history of broken nose, nasal allergies, leg and body jerks, and frequent nighttime urination.  The patient reports the symptoms have been ongoing for less than a year.  She typically goes to bed at 11:30 PM waking at 730-8 AM on weekdays and weekends.  Patient estimates an average of 7+ hours of sleep per night and it takes her a variable amount of time to get to sleep.  She denies use of tobacco, drinks alcohol monthly or less, and denies use of illicit or recreational drugs.  She rarely drinks coffee, occasionally drinks tea, and drinks a diet cola daily. (End copied text).    -A home sleep test was obtained on 11/14/2023 revealing moderate obstructive sleep apnea with an AHI of 24.8/h.     Interval History:  Gale Nina is a 74 y.o. female returns for follow up and compliance of PAP therapy. The patient was last seen on 9/11/2023 by me. Overall the patient feels good with regard to therapy. The device appears to be working appropriately. On average the patient sleeps 7 hours per night. The patient wakes 0 times per night.     The patient reports the following changes to their medical and medication history since they were last seen:  None    Further details are as follows:      Premier Scale is (out of 24): Total score: 0     Weight:  Current Weight: 277 lb      The patient's relevant past medical, surgical, family, and social history reviewed and updated in Epic as appropriate.    PMH:    Past Medical History:    Diagnosis Date    Arrhythmia     Diabetes mellitus     Frequent headaches     Full body hives 2016 02/15/2018    Hyperlipidemia     Hypertension     IBS (irritable bowel syndrome)     RLS (restless legs syndrome)      Past Surgical History:   Procedure Laterality Date    CHOLECYSTECTOMY  1972    COLONOSCOPY  2015    NORMAL COLOGUARD 2019    DILATATION AND CURETTAGE      EXPLORATORY LAPAROTOMY  1997     BOWEL ADHESIONS    KNEE ARTHROSCOPY W/ MENISCECTOMY Right 2010    LAPAROSCOPIC ASSISTED VAGINAL HYSTERECTOMY SALPINGO OOPHORECTOMY Bilateral     LYSIS OF ABDOMINAL ADHESIONS      RECTAL FISSURE INCISION AND DRAINAGE      TONSILLECTOMY       OB History          0    Para   0    Term   0       0    AB   0    Living   0         SAB   0    IAB   0    Ectopic   0    Molar        Multiple   0    Live Births                  Allergies   Allergen Reactions    Other Hives     Monsel soln  Nail polish  euxyl k 400  2 hydoxy-4-methoxy    Benzophenone Hives    Benzoyl Peroxide Hives    [place] Chloride [[place]] Hives    Copper Sulfate Hives    Lidocaine Hcl Hives and Rash    Nickel Hives    Potassium Dichromate Hives    Propolis Hives    Propylene Glycol Hives       MEDS:  Prior to Admission medications    Medication Sig Start Date End Date Taking? Authorizing Provider   ACCU-CHEK ANANYA PLUS test strip  20   Bushra Gorman MD   Acetaminophen (TYLENOL ARTHRITIS PAIN PO) Take  by mouth.    Bushra Gorman MD   Aspirin 81 MG capsule Take 325 mg by mouth 2 (Two) Times a Week.    Bushra Gorman MD   atorvastatin (LIPITOR) 10 MG tablet Take 1 tablet by mouth Daily.    Bushra Gorman MD   Biotin 5000 MCG chewable tablet Chew.    Bushra Gorman MD   Blood Glucose Monitoring Suppl (ACCU-CHEK ANANYA PLUS) w/Device kit  20   Bushra Gorman MD   calcium carbonate-cholecalciferol 500-400 MG-UNIT tablet tablet Take 1 tablet by mouth Daily.    Bushra Gorman MD  "  cetirizine (zyrTEC) 5 MG tablet Take 1 tablet by mouth Daily.    Bushra Gorman MD   Cholecalciferol (VITAMIN D3) 2000 UNITS tablet Take 1 tablet by mouth Daily.    Bushra Gorman MD   conjugated estrogens (PREMARIN) 0.625 MG/GM vaginal cream Insert  into the vagina Every 30 (Thirty) Days.    Bushra Gorman MD   ferrous sulfate 324 (65 Fe) MG tablet delayed-release EC tablet Take 1 tablet by mouth Daily With Breakfast. Hbg is back to normal    Bushra Gorman MD   losartan (COZAAR) 50 MG tablet Take 1 tablet by mouth Daily.    Bushra Gorman MD   metFORMIN ER (GLUCOPHAGE-XR) 500 MG 24 hr tablet  1/5/21   Bushra Gorman MD   pioglitazone (ACTOS) 30 MG tablet  2/16/20   Bushra Gorman MD   Pyridoxine HCl (B-6 PO) Take  by mouth.    Bushra Gorman MD   TURMERIC PO Take  by mouth.    Bushra Gorman MD         FH:  Family History   Problem Relation Age of Onset    Breast cancer Paternal Aunt     Diabetes Mother     Bone cancer Mother     Prostate cancer Father     Lung cancer Brother     Stroke Sister 84    Neuropathy Sister     Colon cancer Niece 52       Objective   Vital Signs:  /70 (BP Location: Left arm, Patient Position: Sitting, Cuff Size: Adult)   Pulse 83   Temp 97.5 °F (36.4 °C) (Temporal)   Ht 172.7 cm (67.99\")   Wt 103 kg (227 lb)   SpO2 97%   BMI 34.52 kg/m²     Patient's (Body mass index is 34.52 kg/m².) indicates that they are obese (BMI >30)         Physical Exam  Vitals reviewed.   Constitutional:       Appearance: Normal appearance.   HENT:      Head: Normocephalic and atraumatic.      Nose: Nose normal.      Mouth/Throat:      Mouth: Mucous membranes are moist.   Cardiovascular:      Rate and Rhythm: Normal rate and regular rhythm.      Heart sounds: No murmur heard.     No friction rub. No gallop.   Pulmonary:      Effort: Pulmonary effort is normal. No respiratory distress.      Breath sounds: Normal breath sounds. No " wheezing or rhonchi.   Neurological:      Mental Status: She is alert and oriented to person, place, and time.   Psychiatric:         Behavior: Behavior normal.               Result Review :           PAP Report:  AHI: 1.8/h  Days of Usage: 87/90 (97%)  Number of Days Greater than 4 hours: 86/90 (96%)  Average time (days used): 7 hours 48 minutes  95th Percentile Pressure: 10.2 cmH2O  95th percentile leaks: 13.3 L/min  Settings: Auto CPAP-5/20 cm H2O, EPR off, response standard.       Assessment and Plan  Gale Nina is a 74 y.o. female who returns for follow-up and compliance of PAP therapy.  The Pap report has been reviewed.  Overall usage is at 97% with compliance at 96%.  Patient averages 7 hours and 48 minutes of therapy.  He is well-controlled with AHI of 1.8/h.  Patient has a history of moderate obstructive sleep apnea with an initial AHI of 24.8/h. I will refill the patient's supplies, and I have asked them to return for follow-up and compliance in 1 year or sooner should they have further questions or concerns.    Diagnoses and all orders for this visit:    1. MIGUEL (obstructive sleep apnea) (Primary)  -     PAP Therapy    2. Obesity (BMI 30.0-34.9)         The patient continues to use and benefit from PAP therapy.    1. The patient was counseled regarding multimodal approach with healthy nutrition, healthy sleep, regular physical activity, social activities, counseling, and medications. Encouraged to practice lateral sleep position. Avoid alcohol and sedatives close to bedtime.     2.  We will refill supplies x1 year.  Return to clinic 1 year or sooner if symptoms warrant. I have reviewed the results of my evaluation and impression and discussed my recommendations in detail with the patient.           Follow Up  Return in about 1 year (around 3/6/2026).  Patient was given instructions and counseling regarding her condition or for health maintenance advice. Please see specific information pulled into the  AVS if appropriate.       MAXIMILIANO Montez, ACNP-BC  Pulmonology, Critical Care, and Sleep Medicine

## 2025-03-06 ENCOUNTER — OFFICE VISIT (OUTPATIENT)
Dept: SLEEP MEDICINE | Age: 75
End: 2025-03-06
Payer: MEDICARE

## 2025-03-06 VITALS
HEART RATE: 83 BPM | OXYGEN SATURATION: 97 % | TEMPERATURE: 97.5 F | WEIGHT: 227 LBS | DIASTOLIC BLOOD PRESSURE: 70 MMHG | HEIGHT: 68 IN | BODY MASS INDEX: 34.4 KG/M2 | SYSTOLIC BLOOD PRESSURE: 140 MMHG

## 2025-03-06 DIAGNOSIS — G47.33 OSA (OBSTRUCTIVE SLEEP APNEA): Primary | ICD-10-CM

## 2025-03-06 DIAGNOSIS — E66.811 OBESITY (BMI 30.0-34.9): ICD-10-CM
